# Patient Record
Sex: FEMALE | Race: WHITE | NOT HISPANIC OR LATINO | URBAN - METROPOLITAN AREA
[De-identification: names, ages, dates, MRNs, and addresses within clinical notes are randomized per-mention and may not be internally consistent; named-entity substitution may affect disease eponyms.]

---

## 2021-05-15 ENCOUNTER — APPOINTMENT (RX ONLY)
Dept: URBAN - METROPOLITAN AREA CLINIC 295 | Facility: CLINIC | Age: 21
Setting detail: DERMATOLOGY
End: 2021-05-15

## 2021-05-15 VITALS — TEMPERATURE: 96.7 F

## 2021-05-15 DIAGNOSIS — L20.89 OTHER ATOPIC DERMATITIS: ICD-10-CM | Status: WORSENING

## 2021-05-15 DIAGNOSIS — L21.8 OTHER SEBORRHEIC DERMATITIS: ICD-10-CM | Status: STABLE

## 2021-05-15 PROCEDURE — ? TREATMENT REGIMEN

## 2021-05-15 PROCEDURE — ? PRESCRIPTION MEDICATION MANAGEMENT

## 2021-05-15 PROCEDURE — ? FULL BODY SKIN EXAM - DECLINED

## 2021-05-15 PROCEDURE — ? PRESCRIPTION

## 2021-05-15 PROCEDURE — 99204 OFFICE O/P NEW MOD 45 MIN: CPT

## 2021-05-15 PROCEDURE — ? COUNSELING

## 2021-05-15 PROCEDURE — ? SCREENING FOR COVID-19

## 2021-05-15 RX ORDER — TRIAMCINOLONE ACETONIDE 1 MG/G
CREAM TOPICAL BID
Qty: 1 | Refills: 3 | Status: ERX | COMMUNITY
Start: 2021-05-15

## 2021-05-15 RX ORDER — HYDROCORTISONE BUTYRATE 1 MG/G
CREAM TOPICAL
Qty: 1 | Refills: 2 | Status: ERX | COMMUNITY
Start: 2021-05-15

## 2021-05-15 RX ADMIN — HYDROCORTISONE BUTYRATE: 1 CREAM TOPICAL at 00:00

## 2021-05-15 RX ADMIN — TRIAMCINOLONE ACETONIDE: 1 CREAM TOPICAL at 00:00

## 2021-05-15 ASSESSMENT — LOCATION SIMPLE DESCRIPTION DERM
LOCATION SIMPLE: RIGHT SUPERIOR EYELID
LOCATION SIMPLE: POSTERIOR SCALP
LOCATION SIMPLE: LEFT EYEBROW

## 2021-05-15 ASSESSMENT — LOCATION DETAILED DESCRIPTION DERM
LOCATION DETAILED: RIGHT MEDIAL SUPERIOR EYELID
LOCATION DETAILED: LEFT LATERAL EYEBROW
LOCATION DETAILED: POSTERIOR MID-PARIETAL SCALP

## 2021-05-15 ASSESSMENT — LOCATION ZONE DERM
LOCATION ZONE: FACE
LOCATION ZONE: EYELID
LOCATION ZONE: SCALP

## 2021-05-15 ASSESSMENT — SEVERITY ASSESSMENT: HOW SEVERE IS THIS PATIENT'S CONDITION?: MILD

## 2021-05-15 NOTE — PROCEDURE: TREATMENT REGIMEN
Show [place] Line: Yes
Action 4: Continue
Continue Regimen: Dove bar soap\\nCetaphil cream
Detail Level: Zone

## 2021-05-15 NOTE — PROCEDURE: PRESCRIPTION MEDICATION MANAGEMENT
Detail Level: Zone
Initiate Treatment: Locoid cream- patient request\\nTac 0.1% cream
Render In Strict Bullet Format?: No
Initiate Treatment: Nizoral shampoo discussed if patient desires